# Patient Record
Sex: MALE | Race: WHITE
[De-identification: names, ages, dates, MRNs, and addresses within clinical notes are randomized per-mention and may not be internally consistent; named-entity substitution may affect disease eponyms.]

---

## 2020-02-11 PROBLEM — Z00.00 ENCOUNTER FOR PREVENTIVE HEALTH EXAMINATION: Status: ACTIVE | Noted: 2020-02-11

## 2020-02-18 ENCOUNTER — NON-APPOINTMENT (OUTPATIENT)
Age: 60
End: 2020-02-18

## 2020-02-18 ENCOUNTER — APPOINTMENT (OUTPATIENT)
Dept: PULMONOLOGY | Facility: CLINIC | Age: 60
End: 2020-02-18
Payer: COMMERCIAL

## 2020-02-18 VITALS
BODY MASS INDEX: 41.32 KG/M2 | TEMPERATURE: 98 F | WEIGHT: 279 LBS | OXYGEN SATURATION: 96 % | DIASTOLIC BLOOD PRESSURE: 80 MMHG | HEART RATE: 100 BPM | HEIGHT: 69 IN | SYSTOLIC BLOOD PRESSURE: 125 MMHG

## 2020-02-18 VITALS — WEIGHT: 279.99 LBS | BODY MASS INDEX: 41.47 KG/M2 | HEIGHT: 69 IN

## 2020-02-18 DIAGNOSIS — J93.83 OTHER PNEUMOTHORAX: ICD-10-CM

## 2020-02-18 DIAGNOSIS — J44.9 CHRONIC OBSTRUCTIVE PULMONARY DISEASE, UNSPECIFIED: ICD-10-CM

## 2020-02-18 PROCEDURE — 94010 BREATHING CAPACITY TEST: CPT

## 2020-02-18 PROCEDURE — 99204 OFFICE O/P NEW MOD 45 MIN: CPT | Mod: 25

## 2020-02-18 RX ORDER — TIOTROPIUM BROMIDE 18 UG/1
18 CAPSULE ORAL; RESPIRATORY (INHALATION) DAILY
Qty: 1 | Refills: 3 | Status: ACTIVE | COMMUNITY
Start: 2020-02-18 | End: 1900-01-01

## 2020-02-18 NOTE — HISTORY OF PRESENT ILLNESS
[TextBox_4] : The patient is a 59-year-old gentleman who comes in with his wife who is my patient\par \par He comes for evaluation of persistent and progressive shortness of breath on exertion\par He was a smoker of as much as 2 packs a day for 40+ years and he stopped 3 years ago\par \par He has had  a fair amount of bronchitis previous pneumonia over the years but has never been consistently treated for COPD\par \par The patient said he had spontaneous pneumothoraces as an 18-year-old requiring chest tubes and eventual pleurocentesis on his right side this was done in 1980\par \par He has multiple other problems however\par Rheumatoid arthritis on Plaquenil for years\par Diabetes on metformin\par Hypertension on lisinopril\par Hypercholesterolemia on rosuvastatin\par GERD on omeprazole\par \par He is a  and he is still working\par \par The patient has snoring but does not appear to have periodic breathing\par His wife does have obstructive sleep apnea and she is aware of it but he has no symptoms suggesting active obstructive sleep apnea\par \par He has gained about 40-50 pounds over the last several years\par \par \par  MODERATE

## 2020-02-18 NOTE — ASSESSMENT
[FreeTextEntry1] : 59-year-old gentleman with at least a 40-pack-year history of smoking until 3 years ago who was short of breath on exertion\par \par He does have symptoms consistent with COPD for a number of years\par He also has a history of bilateral spontaneous pneumothoraces requiring pleurodescis on his right side when he was younger\par \par The patient had a recent cardiac workup which showed no significant coronary artery disease and we assume normal left ventricular function\par \par He has not had a chest x-ray in some time so I would like to obtain a chest x-ray as well as inspiratory and expiratory films to check diaphragmatic function\par Depending on the results a CAT scan may be necessary\par \par I do not suspect thromboembolic disease at this time however\par \par The patient's shortness of breath may be due to a combination of COPD, deconditioning, and obesity\par \par The patient does not appear to have clinically significant obstructive sleep apnea by history\par He does have diabetes hypertension hyperlipidemia as well as his history of rheumatoid arthritis on Plaquenil\par \par I would like the patient to obtain full pulmonary function testing\par \par I will start the patient on Spiriva once a day as well as p.r.n. use of albuterol\par \par Like to reevaluate the patient again in 4-6 weeks

## 2020-02-18 NOTE — PHYSICAL EXAM
[Normal Appearance] : normal appearance [Normal Oropharynx] : normal oropharynx [No Acute Distress] : no acute distress [Normal Rate/Rhythm] : normal rate/rhythm [Normal S1, S2] : normal s1, s2 [No Neck Mass] : no neck mass [No Murmurs] : no murmurs [Clear to Auscultation Bilaterally] : clear to auscultation bilaterally [No Resp Distress] : no resp distress [No Abnormalities] : no abnormalities [Normal Gait] : normal gait [Benign] : benign [No Clubbing] : no clubbing [No Edema] : no edema [No Cyanosis] : no cyanosis [FROM] : FROM [No Focal Deficits] : no focal deficits [Normal Color/ Pigmentation] : normal color/ pigmentation [Oriented x3] : oriented x3 [Normal Affect] : normal affect

## 2020-02-18 NOTE — PROCEDURE
[FreeTextEntry1] : Spirometry demonstrates decreased midexpiratory flow rates of 53%\par \par The patient was exercised in the office\par At rest on room air oxygen saturation was 94% with a pulse of 108\par With exercise the patient became short of breath but only decreased oxygen sat on room at 93% with a heart rate increased to 124\par Exercise distance was about 125 feet

## 2020-02-18 NOTE — CONSULT LETTER
[Dear  ___] : Dear  [unfilled], [FreeTextEntry1] : I had the pleasure of evaluating your patient, JOCE SINGH , in the office today.  Please review my consultation and evaluation report that follows below.  Please do not hesitate to call me if further information is necessary or if you wish to discuss ongoing care or diagnostic work-up.   \par I very much appreciate your referral and it is a privilege to be able to provide care for your patient.\par \par Sincerely,\par  \par Ty Peraza MD, MHCM, FACP\par Pulmonary Medicine\par  of Medicine\par [employer] at [facility]\par \par [email or web address]\par [facility] [place]\par \par

## 2020-05-04 ENCOUNTER — APPOINTMENT (OUTPATIENT)
Dept: PULMONOLOGY | Facility: CLINIC | Age: 60
End: 2020-05-04